# Patient Record
Sex: FEMALE | Race: WHITE
[De-identification: names, ages, dates, MRNs, and addresses within clinical notes are randomized per-mention and may not be internally consistent; named-entity substitution may affect disease eponyms.]

---

## 2017-07-21 ENCOUNTER — HOSPITAL ENCOUNTER (OUTPATIENT)
Dept: HOSPITAL 45 - C.LABMFLN | Age: 47
Discharge: HOME | End: 2017-07-21
Attending: OBSTETRICS & GYNECOLOGY
Payer: COMMERCIAL

## 2017-07-21 DIAGNOSIS — E03.9: ICD-10-CM

## 2017-07-21 DIAGNOSIS — E03.8: Primary | ICD-10-CM

## 2017-07-21 LAB — TSH SERPL-ACNC: 0.16 UIU/ML (ref 0.3–4.5)

## 2017-07-22 LAB
THYROGLOB AB SERPL-ACNC: <1 IU/ML
THYROPEROXIDASE AB SERPL-ACNC: 99 IU/ML (ref ?–9)

## 2017-07-23 NOTE — CODING QUERY NO DIAGNOSIS
TREATMENT RENDERED WITHOUT A DIAGNOSIS                                                  



To promote full compliance with coding requirements relating to patient care, physician 
participation is requested in all cases of  uncertainty.  Please assist us with 
providing a diagnosis/symptom for the test(s) below:



A diagnosis/symptom was not documented on your Order.  A valid diagnosis/symptom is 
required to bill all insurances.



**Please remember that we are unable to code a diagnosis of rule out, probable, possible, 
questionable, or suspected.  



DATE OF SERVICE: 7/21/17   (There is no order found in our system)



Tests that require a diagnosis:



* ANTI-THYROID AB GROUP         DIAGNOSIS:__________

* T4 FREE                                        DIAGNOSIS:__________

* THYROID STIMULATING_         DIAGNOSIS:___________





Provider Signature: _____________________________ Date: _________



Thank you  

Elizabeth FERNANDES [facility] Information Management

Phone:  [phone]

Fax:  [phone]



Once completed, please kindly fax back to [phone]



For questions please call [phone]

## 2017-10-02 ENCOUNTER — HOSPITAL ENCOUNTER (OUTPATIENT)
Dept: HOSPITAL 45 - C.LABMFLN | Age: 47
Discharge: HOME | End: 2017-10-02
Attending: PHYSICIAN ASSISTANT
Payer: COMMERCIAL

## 2017-10-02 DIAGNOSIS — E03.9: Primary | ICD-10-CM

## 2017-10-02 DIAGNOSIS — E55.9: ICD-10-CM

## 2017-10-02 DIAGNOSIS — E66.9: ICD-10-CM

## 2017-10-02 LAB
ANION GAP SERPL CALC-SCNC: 7 MMOL/L (ref 3–11)
BUN SERPL-MCNC: 13 MG/DL (ref 7–18)
BUN/CREAT SERPL: 16.4 (ref 10–20)
CALCIUM SERPL-MCNC: 9.1 MG/DL (ref 8.5–10.1)
CHLORIDE SERPL-SCNC: 106 MMOL/L (ref 98–107)
CO2 SERPL-SCNC: 27 MMOL/L (ref 21–32)
CREAT SERPL-MCNC: 0.8 MG/DL (ref 0.6–1.2)
EST. AVERAGE GLUCOSE BLD GHB EST-MCNC: 100 MG/DL
GLUCOSE SERPL-MCNC: 85 MG/DL (ref 70–99)
HA1C FLAG: 25
POTASSIUM SERPL-SCNC: 4 MMOL/L (ref 3.5–5.1)
SODIUM SERPL-SCNC: 140 MMOL/L (ref 136–145)
TSH SERPL-ACNC: 0.22 UIU/ML (ref 0.3–4.5)

## 2017-11-20 ENCOUNTER — HOSPITAL ENCOUNTER (OUTPATIENT)
Dept: HOSPITAL 45 - C.LABMFLN | Age: 47
Discharge: HOME | End: 2017-11-20
Attending: PHYSICIAN ASSISTANT
Payer: COMMERCIAL

## 2017-11-20 DIAGNOSIS — E03.9: Primary | ICD-10-CM

## 2017-11-20 LAB — TSH SERPL-ACNC: 0.68 UIU/ML (ref 0.3–4.5)

## 2017-12-04 ENCOUNTER — HOSPITAL ENCOUNTER (OUTPATIENT)
Dept: HOSPITAL 45 - C.LABMFLN | Age: 47
Discharge: HOME | End: 2017-12-04
Attending: FAMILY MEDICINE
Payer: COMMERCIAL

## 2017-12-04 DIAGNOSIS — R53.83: Primary | ICD-10-CM

## 2017-12-04 LAB
BASOPHILS # BLD: 0.03 K/UL (ref 0–0.2)
BASOPHILS NFR BLD: 0.5 %
COMPLETE: YES
EOSINOPHIL NFR BLD AUTO: 204 K/UL (ref 130–400)
HCT VFR BLD CALC: 43.1 % (ref 37–47)
IG%: 0.5 %
IMM GRANULOCYTES NFR BLD AUTO: 36.3 %
LYMPHOCYTES # BLD: 2.25 K/UL (ref 1.2–3.4)
MCH RBC QN AUTO: 30.9 PG (ref 25–34)
MCHC RBC AUTO-ENTMCNC: 33.9 G/DL (ref 32–36)
MCV RBC AUTO: 91.1 FL (ref 80–100)
MONOCYTES NFR BLD: 7.1 %
NEUTROPHILS # BLD AUTO: 1.9 %
NEUTROPHILS NFR BLD AUTO: 53.7 %
PMV BLD AUTO: 9 FL (ref 7.4–10.4)
RBC # BLD AUTO: 4.73 M/UL (ref 4.2–5.4)
WBC # BLD AUTO: 6.2 K/UL (ref 4.8–10.8)

## 2018-03-07 ENCOUNTER — HOSPITAL ENCOUNTER (OUTPATIENT)
Dept: HOSPITAL 45 - C.LABMFLN | Age: 48
Discharge: HOME | End: 2018-03-07
Attending: PHYSICIAN ASSISTANT
Payer: COMMERCIAL

## 2018-03-07 DIAGNOSIS — E03.9: Primary | ICD-10-CM

## 2018-03-14 ENCOUNTER — HOSPITAL ENCOUNTER (OUTPATIENT)
Dept: HOSPITAL 45 - C.MRI | Age: 48
Discharge: HOME | End: 2018-03-14
Attending: FAMILY MEDICINE
Payer: COMMERCIAL

## 2018-03-14 DIAGNOSIS — M23.91: Primary | ICD-10-CM

## 2018-03-14 NOTE — DIAGNOSTIC IMAGING REPORT
MRI OF THE  RIGHT KNEE



CLINICAL HISTORY:  Right knee pain.



COMPARISON STUDY: No priors.



TECHNIQUE: MRI of the right knee was performed utilizing proton density, T1, and

T2-weighted sequences in the axial, sagittal, coronal planes. IV contrast was

not administered for this examination. Note that interpretation is suboptimal

without plain film correlate.



FINDINGS:



Menisci: There is an oblique tear involving the body and posterior horn of the

medial meniscus. A fragment is flipped peripherally along the medial tibial

plateau. This is best seen on coronal image #19. The lateral meniscus is intact.



Ligaments: The anterior and posterior cruciate ligaments are intact. There is

edema and fluid around the medial collateral ligament, likely related to the

bucket-handle meniscal tear. Grade 1 injury is not excluded. The fibers are

intact. The lateral collateral complex is preserved.



Extensor mechanism: The extensor mechanism is intact. Hoffa's fat pad is normal

in appearance.



Articular cartilage and bone: The articular cartilage is intact and well

maintained all 3 compartments. Normal marrow signal is preserved of the

visualized bony structures.



Joint effusion: There is a moderate joint effusion.



Soft tissues: There is generalized atrophy of the regional musculature. No

intramuscular edema is seen. Soft tissue edema is identified along the medial

aspect of the knee and superficial to the patellar tendon.





IMPRESSION: 



1. There is a large oblique tear involving the body and posterior horn of the

medial meniscus. A flipped fragment is seen peripherally along the medial tibial

plateau.



2. Question grade 1 injury of the medial collateral ligament as above.



3. The lateral meniscus, cruciate ligaments, and the lateral collateral ligament

appear intact.



4. Moderate joint effusion.



5. Soft tissue edema is present, greatest along the medial aspect of the knee.







Electronically signed by:  Ruddy Jay M.D.

3/14/2018 5:24 PM



Dictated Date/Time:  3/14/2018 5:19 PM

## 2018-08-03 ENCOUNTER — HOSPITAL ENCOUNTER (OUTPATIENT)
Dept: HOSPITAL 45 - C.MAMM | Age: 48
Discharge: HOME | End: 2018-08-03
Attending: FAMILY MEDICINE
Payer: COMMERCIAL

## 2018-08-03 DIAGNOSIS — Z12.31: Primary | ICD-10-CM

## 2018-08-06 NOTE — MAMMOGRAPHY REPORT
BILATERAL DIGITAL SCREENING MAMMOGRAM TOMOSYNTHESIS WITH CAD: 8/3/2018

CLINICAL HISTORY: Routine screening. Patient has no complaints.  





TECHNIQUE: The study was acquired using full field digital technology and interpreted from soft copy.
 Breast tomosynthesis in addition to standard 2D mammography was performed. Current study was also ev
aluated with a Computer Aided Detection (CAD) system.  



COMPARISON: Comparison is made to exams dated:  7/9/2010 mammogram, 7/10/2012 mammogram, 7/18/2014 ma
mmogram, and 8/2/2017 mammogram.   

BREAST COMPOSITION: The tissue of both breasts is heterogeneously dense, which may obscure small mass
es.  



FINDINGS: 

No suspicious masses, calcifications, or areas of architectural distortion are noted in either breast
. There has been no significant interval change compared to prior exams. Scattered bilateral benign-a
ppearing calcifications are not significantly changed.  



IMPRESSION: ACR BI-RADS CATEGORY 2: BENIGN

There is no mammographic evidence of malignancy. A 1 year screening mammogram is recommended.(08/04/2
019)  The patient will receive written notification of the results.  





Some breast cancers are not detected with mammography. A negative mammographic report should not klaus
y biopsy if a clinically suggestive mass is present.



Debora Salazar M.D.          

ah/:8/3/2018 15:43:17  



Imaging Technologist: Hanane Dueñas, [facility]

letter sent: Normal 1/2  

BI-RADS Code: ACR BI-RADS Category 2: Benign